# Patient Record
Sex: FEMALE | Race: OTHER | NOT HISPANIC OR LATINO | URBAN - METROPOLITAN AREA
[De-identification: names, ages, dates, MRNs, and addresses within clinical notes are randomized per-mention and may not be internally consistent; named-entity substitution may affect disease eponyms.]

---

## 2020-12-19 ENCOUNTER — EMERGENCY (EMERGENCY)
Facility: HOSPITAL | Age: 25
LOS: 1 days | Discharge: HOME | End: 2020-12-19
Admitting: EMERGENCY MEDICINE
Payer: OTHER MISCELLANEOUS

## 2020-12-19 VITALS
HEART RATE: 88 BPM | SYSTOLIC BLOOD PRESSURE: 108 MMHG | DIASTOLIC BLOOD PRESSURE: 72 MMHG | OXYGEN SATURATION: 97 % | RESPIRATION RATE: 18 BRPM | TEMPERATURE: 99 F
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
## 2020-12-19 DIAGNOSIS — M25.512 PAIN IN LEFT SHOULDER: ICD-10-CM
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
## 2020-12-19 DIAGNOSIS — S66.912A STRAIN OF UNSPECIFIED MUSCLE, FASCIA AND TENDON AT WRIST AND HAND LEVEL, LEFT HAND, INITIAL ENCOUNTER: ICD-10-CM

## 2020-12-19 DIAGNOSIS — M79.673 PAIN IN UNSPECIFIED FOOT: ICD-10-CM

## 2020-12-19 PROCEDURE — 99284 EMERGENCY DEPT VISIT MOD MDM: CPT
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
## 2020-12-19 NOTE — ED PROVIDER NOTE - PATIENT PORTAL LINK FT
You can access the FollowMyHealth Patient Portal offered by [facility] by registering at the following website: [email or web address]. By joining [email or web address]’s FollowMyHealth portal, you will also be able to view your health information using other applications (apps) compatible with our system.

## 2020-12-19 NOTE — ED PROVIDER NOTE - CHIEF COMPLAINT
----- Message from [address]. Onel Neil NP sent at 2/12/2018  9:43 AM EST -----  Regarding: follow up appt. Please schedule him a follow up appointment with Reji Michelle. He is having frequent reaccumulation of ascites and I think he should be evaluated for a TIPS. I will order an ECHO if one has not been performed recently. He is going today for paracentesis and I have gone up to Step 2 on diuretics. Thanks.     Scot Situ
Contacted patient to schedule appt with Dr. Jana Ayala earlier to discuss TIPS. Patient stated he had too many appointment coming in the near future for PCP, Cardiologist, etc to schedule an earlier appointment with Dr. Jana Ayala. Explained the TIPS procedure and how it would benefit him in the long run. Also explain the risk of infection with multiple paracentesis. Patient stated he is not emotionally or financially ready for a TIPS procedure. Patient will discuss during next follow up with Dr. Jana Ayala. Jean-Paul Ellis NP informed.
The patient is a 25y Female complaining of hand pain/injury.
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
## 2022-05-03 NOTE — ED PROVIDER NOTE - CPE EDP SKIN NORM
From: Lolly Altamirano  To: Leslye Villa MD  Sent: 1/4/6905 7:47 PM EDT  Subject: Hydrocodone     I was told to send a message on the 3rd to get my Hydrocodone refill sent into [facility] on [address] in [place]. Not the 60 ct but the normal script for 120 ct. The phone number is [phone]. Ill be waiting for your response and thank you!
normal...

## 2022-11-11 NOTE — ED PROVIDER NOTE - EYES, MLM
Clear bilaterally, pupils equal, round and reactive to light. Doxycycline Counseling:  Patient counseled regarding possible photosensitivity and increased risk for sunburn.  Patient instructed to avoid sunlight, if possible.  When exposed to sunlight, patients should wear protective clothing, sunglasses, and sunscreen.  The patient was instructed to call the office immediately if the following severe adverse effects occur:  hearing changes, easy bruising/bleeding, severe headache, or vision changes.  The patient verbalized understanding of the proper use and possible adverse effects of doxycycline.  All of the patient's questions and concerns were addressed.

## 2024-11-07 NOTE — ED ADULT NURSE NOTE - NS ED NURSE LEVEL OF CONSCIOUSNESS ORIENTATION
What Type Of Note Output Would You Prefer (Optional)?: Standard Output How Severe Are Your Spot(S)?: mild Have Your Spot(S) Been Treated In The Past?: has not been treated Hpi Title: Evaluation of Skin Lesions Oriented - self; Oriented - place; Oriented - time